# Patient Record
Sex: MALE
[De-identification: names, ages, dates, MRNs, and addresses within clinical notes are randomized per-mention and may not be internally consistent; named-entity substitution may affect disease eponyms.]

---
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
## 2021-03-25 ENCOUNTER — TRANSCRIPTION ENCOUNTER (OUTPATIENT)
Age: 21
End: 2021-03-25

## 2022-08-12 ENCOUNTER — EMERGENCY (EMERGENCY)
Facility: HOSPITAL | Age: 22
LOS: 1 days | Discharge: HOME | End: 2022-08-12
Attending: STUDENT IN AN ORGANIZED HEALTH CARE EDUCATION/TRAINING PROGRAM
Payer: MEDICAID

## 2022-08-12 VITALS
DIASTOLIC BLOOD PRESSURE: 72 MMHG | RESPIRATION RATE: 18 BRPM | SYSTOLIC BLOOD PRESSURE: 127 MMHG | HEIGHT: 66 IN | HEART RATE: 86 BPM | WEIGHT: 154.98 LBS | OXYGEN SATURATION: 98 % | TEMPERATURE: 98 F

## 2022-08-12 PROCEDURE — 73130 X-RAY EXAM OF HAND: CPT

## 2022-08-12 PROCEDURE — 99284 EMERGENCY DEPT VISIT MOD MDM: CPT | Mod: 25

## 2022-08-12 PROCEDURE — 99284 EMERGENCY DEPT VISIT MOD MDM: CPT | Mod: 57

## 2022-08-12 PROCEDURE — 26600 TREAT METACARPAL FRACTURE: CPT | Mod: 54,RT

## 2022-08-12 PROCEDURE — 73130 X-RAY EXAM OF HAND: CPT | Mod: 26,RT

## 2022-08-12 PROCEDURE — 73120 X-RAY EXAM OF HAND: CPT

## 2022-08-12 PROCEDURE — 29105 APPLICATION LONG ARM SPLINT: CPT | Mod: RT

## 2022-08-12 RX ADMIN — Medication 1 TABLET(S): at 19:35

## 2022-08-12 NOTE — ED PROVIDER NOTE - NSFOLLOWUPINSTRUCTIONS_ED_ALL_ED_FT
- May apply ice to affected areas 10-15 minutes at a time, multiple times per day. You may use as frequently as desired.  - May take ibuprofen 600mg every 6 hours as needed for pain control  - Elevate extremity while resting   - Rest affected area, avoid heavy lifting, exertion  - Remain in splint until evaluated by hand surgery  - Follow-up with hand surgeon provided within 1 week  - Please complete course of augmentin as directed     Fracture    A fracture is a break in one of your bones. This can occur from a variety of injuries, especially traumatic ones. Symptoms include pain, bruising, or swelling. Do not use the injured limb. If a fracture is in one of the bones below your waist, do not put weight on that limb unless instructed to do so by your healthcare provider. Crutches or a cane may have been provided. A splint or cast may have been applied by your health care provider. Make sure to keep it dry and follow up with an orthopedist as instructed.    SEEK IMMEDIATE MEDICAL CARE IF YOU HAVE ANY OF THE FOLLOWING SYMPTOMS: numbness, tingling, increasing pain, or weakness in any part of the injured limb.
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
## 2022-08-12 NOTE — ED PROVIDER NOTE - PATIENT PORTAL LINK FT
You can access the FollowMyHealth Patient Portal offered by [facility] by registering at the following website: [email or web address]. By joining [employer]’s FollowMyHealth portal, you will also be able to view your health information using other applications (apps) compatible with our system.

## 2022-08-12 NOTE — ED PROVIDER NOTE - NS ED ATTENDING STATEMENT MOD
This was a shared visit with the NEY. I reviewed and verified the documentation and independently performed the documented:

## 2022-08-12 NOTE — ED PROVIDER NOTE - CARE PROVIDER_API CALL
Richard Bull)  Surgery  [address] [place] [address]  Phone: [phone]  Fax: [phone]  Follow Up Time:
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
## 2022-08-12 NOTE — ED PROVIDER NOTE - PHYSICAL EXAMINATION
Gen: No acute distress, non toxic  HENT: NCAT, Mucous membranes moist, Oropharynx without exudates, uvula midline  Eyes: pink conjunctivae, EOMI, PERRL  Neuro: A&O x 3, CN II-XII intact, sensorimotor intact without deficits   MSK: Swelling to 4th/5th MC area R hand, able to make fist with pain on finger flexion. +TPP over 4th and 5th MCP and MC.   Vasc: Cap refill <2sec, radial pulses 2+ b/l  Skin: Ecchymotic area to dorsum of R hand over 4th/5th MC. 2 small abrasions to 4th and 5th digits, dorsum of proximal phalanges
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
## 2022-08-12 NOTE — ED PROVIDER NOTE - NS ED ROS FT
Gen: denies fever, chills, fatigue, weight loss  Skin: +Abrasions. denies rashes, laceration, bruising  HEENT: denies visual changes, ear pain, nasal congestion, throat pain  Respiratory: denies SCHREIBER, SOB, cough, wheezing  Cardiovascular: denies chest pain, palpitations, diaphoresis, LE edema  GI: denies abdominal pain, n/v/d  : denies dysuria, frequency, urgency, bowel/bladder incontinence  MSK: +Hand pain. Denies back pain, neck pain  Neuro: denies headache, dizziness, weakness, numbness  Psych: denies anxiety, depression, SI/HI, visual/auditory hallucinations
